# Patient Record
Sex: FEMALE | Race: WHITE | Employment: FULL TIME | URBAN - METROPOLITAN AREA
[De-identification: names, ages, dates, MRNs, and addresses within clinical notes are randomized per-mention and may not be internally consistent; named-entity substitution may affect disease eponyms.]

---

## 2017-10-12 PROBLEM — S52.514A CLOSED NONDISPLACED FRACTURE OF STYLOID PROCESS OF RIGHT RADIUS, INITIAL ENCOUNTER: Status: ACTIVE | Noted: 2017-10-12

## 2017-10-25 PROBLEM — S52.514D CLOSED NONDISPLACED FRACTURE OF STYLOID PROCESS OF RIGHT RADIUS WITH ROUTINE HEALING, SUBSEQUENT ENCOUNTER: Status: ACTIVE | Noted: 2017-10-12

## 2024-06-24 ENCOUNTER — APPOINTMENT (OUTPATIENT)
Dept: GENERAL RADIOLOGY | Age: 59
End: 2024-06-24
Attending: EMERGENCY MEDICINE

## 2024-06-24 ENCOUNTER — HOSPITAL ENCOUNTER (EMERGENCY)
Age: 59
Discharge: HOME | End: 2024-06-24
Attending: EMERGENCY MEDICINE

## 2024-06-24 VITALS
TEMPERATURE: 98 F | WEIGHT: 201 LBS | RESPIRATION RATE: 16 BRPM | OXYGEN SATURATION: 99 % | HEART RATE: 68 BPM | DIASTOLIC BLOOD PRESSURE: 77 MMHG | HEIGHT: 64 IN | BODY MASS INDEX: 34.31 KG/M2 | SYSTOLIC BLOOD PRESSURE: 96 MMHG

## 2024-06-24 DIAGNOSIS — S32.010A COMPRESSION FRACTURE OF L1 VERTEBRA, INITIAL ENCOUNTER (HCC): Primary | ICD-10-CM

## 2024-06-24 PROCEDURE — 96374 THER/PROPH/DIAG INJ IV PUSH: CPT

## 2024-06-24 PROCEDURE — 99284 EMERGENCY DEPT VISIT MOD MDM: CPT

## 2024-06-24 PROCEDURE — 72100 X-RAY EXAM L-S SPINE 2/3 VWS: CPT | Performed by: EMERGENCY MEDICINE

## 2024-06-24 RX ORDER — HYDROMORPHONE HYDROCHLORIDE 1 MG/ML
1 INJECTION, SOLUTION INTRAMUSCULAR; INTRAVENOUS; SUBCUTANEOUS ONCE
Status: COMPLETED | OUTPATIENT
Start: 2024-06-24 | End: 2024-06-24

## 2024-06-24 RX ORDER — HYDROCODONE BITARTRATE AND ACETAMINOPHEN 5; 325 MG/1; MG/1
1 TABLET ORAL EVERY 6 HOURS PRN
COMMUNITY

## 2024-06-24 RX ORDER — DIAZEPAM 5 MG/1
5 TABLET ORAL 3 TIMES DAILY PRN
Qty: 20 TABLET | Refills: 0 | Status: SHIPPED | OUTPATIENT
Start: 2024-06-24 | End: 2024-07-01

## 2024-06-24 RX ORDER — CYCLOBENZAPRINE HCL 5 MG
5 TABLET ORAL 3 TIMES DAILY PRN
COMMUNITY

## 2024-06-24 RX ORDER — ESCITALOPRAM OXALATE 10 MG/1
10 TABLET ORAL DAILY
COMMUNITY

## 2024-06-24 RX ORDER — ALPRAZOLAM 0.25 MG/1
0.25 TABLET ORAL NIGHTLY PRN
COMMUNITY

## 2024-06-24 RX ORDER — KETOROLAC TROMETHAMINE 15 MG/ML
15 INJECTION, SOLUTION INTRAMUSCULAR; INTRAVENOUS ONCE
Status: COMPLETED | OUTPATIENT
Start: 2024-06-24 | End: 2024-06-24

## 2024-06-24 RX ORDER — OMEPRAZOLE 20 MG/1
20 CAPSULE, DELAYED RELEASE ORAL
COMMUNITY

## 2024-06-24 NOTE — ED INITIAL ASSESSMENT (HPI)
Pt reports exacerbation of chronic lower back pain that has progressively gotten worse following PT for right knee surgery. \"They had me doing squats on Monday,\" pt sts.

## 2024-06-24 NOTE — ED PROVIDER NOTES
Patient Seen in: [facility] In [place]      History     Chief Complaint   Patient presents with    Back Pain     Stated Complaint: Pt reports exacerbation of chronic lower back pain that has progressively gotte*    Subjective:   HPI    58-year-old female complaining of lower back pain.  The patient states that she fell several years ago and had a compression fracture of L1 and recently had been doing well and not having any back pain but had surgery around April 15 of her right knee with a meniscus repair so she has been going to physical therapy and last week they had her doing some squats and about 5 or 6 days ago she started having some pain it worsened.  She had some hydrocodone and cyclobenzaprine at home which did not seem to improve things.  The pain is mainly in the back her knee is not bothering her there is no radicular pain no numbness tingling no bowel or bladder issues no fever chills no history of recent trauma.    Objective:   Past Medical History:    Anxiety    Back pain    Depression    Hyperlipidemia              Past Surgical History:   Procedure Laterality Date    Knee surgery Right     Repair ing hernia,5+y/o,reducibl                  Social History     Tobacco Use    Smoking status: Every Day     Types: Cigarettes     Passive exposure: Never    Smokeless tobacco: Never   Vaping Use    Vaping status: Never Used   Substance and Sexual Activity    Alcohol use: Yes     Comment: twice a week    Drug use: Never              Review of Systems    Positive for stated complaint: Pt reports exacerbation of chronic lower back pain that has progressively gotte*  Other systems are as noted in HPI.  Constitutional and vital signs reviewed.      All other systems reviewed and negative except as noted above.    Physical Exam     ED Triage Vitals [06/24/24 0705]   /61   Pulse 84   Resp 18   Temp 97.6 °F (36.4 °C)   Temp src Temporal   SpO2 99 %   O2 Device        Current Vitals:    Vital Signs  BP: 107/61  Pulse: 84  Resp: 18  Temp: 97.6 °F (36.4 °C)  Temp src: Temporal    Oxygen Therapy  SpO2: 99 %            Physical Exam    Patient is alert and orient x 3 appears uncomfortable the back there is tenderness in the midline around the mid to lower lumbar region.  Straight leg raises are negative the motor strength lower extremities normal sensations intact deep tendon reflexes are normal.      ED Course   Labs Reviewed - No data to display          XR LUMBAR SPINE (MIN 2 VIEWS) (CPT=72100)    Result Date: 6/24/2024  CONCLUSION:  Age-indeterminate compression deformity of L1 vertebral body with approximately 50% loss of height.  This could be best assessed with MRI if clinically indicated.   LOCATION:  [facility]   Dictated by (CST): Angel Perez MD on 6/24/2024 at 8:05 AM     Finalized by (CST): Angel Perez MD on 6/24/2024 at 8:06 AM      Images independently reviewed there is compression fracture of L1         MDM      Initial differential diagnosis includes but not limited to worsening of the compression fracture muscular back pain lumbar radiculopathy renal colic    Patient has a L1 compression fracture she has a history of this is unclear whether this is similar to what it has been in the past or is worsened she is neurologically intact advised her to follow-up with her spine doctor she was given pain medication emergency department prescription for home advised return if worse                               MDM    Disposition and Plan     Clinical Impression:  No diagnosis found.     Disposition:  There is no disposition on file for this visit.  There is no disposition time on file for this visit.    Follow-up:  No follow-up provider specified.        Medications Prescribed:  Current Discharge Medication List

## 2024-06-24 NOTE — DISCHARGE INSTRUCTIONS
Follow-up with spine doctor over the next week or so.  Use ice 20 is 4 times a day Tylenol or Advil for milder pain take the Norco or you can take diazepam for more severe pain diazepam as will help with pain as well as muscle spasm.  Do not take it concurrently with Norco if you are taking Norco wait 2 or 3 hours before you take the diazepam as able to make you drowsy.

## 2025-06-10 PROBLEM — F31.30 BIPOLAR DISORDER WITH CURRENT EPISODE DEPRESSED (HCC): Status: ACTIVE | Noted: 2025-06-10

## 2025-06-11 PROBLEM — F31.63 BIPOLAR DISORDER, CURRENT EPISODE MIXED, SEVERE, WITHOUT PSYCHOTIC FEATURES (HCC): Status: ACTIVE | Noted: 2025-06-11

## 2025-06-12 PROBLEM — F31.30 BIPOLAR DISORDER WITH CURRENT EPISODE DEPRESSED (HCC): Status: RESOLVED | Noted: 2025-06-10 | Resolved: 2025-06-12

## 2025-06-13 ENCOUNTER — HOSPITAL ENCOUNTER (EMERGENCY)
Facility: HOSPITAL | Age: 60
Discharge: NURSING, REHAB OR LONG-TERM CARE | End: 2025-06-13
Attending: EMERGENCY MEDICINE
Payer: COMMERCIAL

## 2025-06-13 VITALS
RESPIRATION RATE: 16 BRPM | TEMPERATURE: 98 F | DIASTOLIC BLOOD PRESSURE: 73 MMHG | OXYGEN SATURATION: 99 % | SYSTOLIC BLOOD PRESSURE: 99 MMHG | HEART RATE: 70 BPM

## 2025-06-13 DIAGNOSIS — E86.0 DEHYDRATION: Primary | ICD-10-CM

## 2025-06-13 DIAGNOSIS — I95.9 HYPOTENSION, UNSPECIFIED HYPOTENSION TYPE: ICD-10-CM

## 2025-06-13 LAB
ALBUMIN SERPL-MCNC: 4.7 G/DL (ref 3.2–4.8)
ALBUMIN/GLOB SERPL: 1.6 {RATIO} (ref 1–2)
ALP LIVER SERPL-CCNC: 78 U/L (ref 46–118)
ALT SERPL-CCNC: 14 U/L (ref 10–49)
ANION GAP SERPL CALC-SCNC: 11 MMOL/L (ref 0–18)
AST SERPL-CCNC: 14 U/L (ref ?–34)
BASOPHILS # BLD AUTO: 0.07 X10(3) UL (ref 0–0.2)
BASOPHILS NFR BLD AUTO: 0.7 %
BILIRUB SERPL-MCNC: 0.3 MG/DL (ref 0.3–1.2)
BUN BLD-MCNC: 17 MG/DL (ref 9–23)
CALCIUM BLD-MCNC: 9.5 MG/DL (ref 8.7–10.6)
CHLORIDE SERPL-SCNC: 109 MMOL/L (ref 98–112)
CO2 SERPL-SCNC: 20 MMOL/L (ref 21–32)
CREAT BLD-MCNC: 1.1 MG/DL (ref 0.55–1.02)
EGFRCR SERPLBLD CKD-EPI 2021: 58 ML/MIN/1.73M2 (ref 60–?)
EOSINOPHIL # BLD AUTO: 0.12 X10(3) UL (ref 0–0.7)
EOSINOPHIL NFR BLD AUTO: 1.3 %
ERYTHROCYTE [DISTWIDTH] IN BLOOD BY AUTOMATED COUNT: 12.7 %
GLOBULIN PLAS-MCNC: 3 G/DL (ref 2–3.5)
GLUCOSE BLD-MCNC: 128 MG/DL (ref 70–99)
HCT VFR BLD AUTO: 38.7 % (ref 35–48)
HGB BLD-MCNC: 13.1 G/DL (ref 12–16)
IMM GRANULOCYTES # BLD AUTO: 0.1 X10(3) UL (ref 0–1)
IMM GRANULOCYTES NFR BLD: 1.1 %
LYMPHOCYTES # BLD AUTO: 1.97 X10(3) UL (ref 1–4)
LYMPHOCYTES NFR BLD AUTO: 20.8 %
MCH RBC QN AUTO: 29.6 PG (ref 26–34)
MCHC RBC AUTO-ENTMCNC: 33.9 G/DL (ref 31–37)
MCV RBC AUTO: 87.6 FL (ref 80–100)
MONOCYTES # BLD AUTO: 1 X10(3) UL (ref 0.1–1)
MONOCYTES NFR BLD AUTO: 10.6 %
NEUTROPHILS # BLD AUTO: 6.21 X10 (3) UL (ref 1.5–7.7)
NEUTROPHILS # BLD AUTO: 6.21 X10(3) UL (ref 1.5–7.7)
NEUTROPHILS NFR BLD AUTO: 65.5 %
OSMOLALITY SERPL CALC.SUM OF ELEC: 293 MOSM/KG (ref 275–295)
PLATELET # BLD AUTO: 208 10(3)UL (ref 150–450)
POTASSIUM SERPL-SCNC: 3.9 MMOL/L (ref 3.5–5.1)
PROT SERPL-MCNC: 7.7 G/DL (ref 5.7–8.2)
RBC # BLD AUTO: 4.42 X10(6)UL (ref 3.8–5.3)
SODIUM SERPL-SCNC: 140 MMOL/L (ref 136–145)
WBC # BLD AUTO: 9.5 X10(3) UL (ref 4–11)

## 2025-06-13 PROCEDURE — 96361 HYDRATE IV INFUSION ADD-ON: CPT

## 2025-06-13 PROCEDURE — 99284 EMERGENCY DEPT VISIT MOD MDM: CPT

## 2025-06-13 PROCEDURE — 85025 COMPLETE CBC W/AUTO DIFF WBC: CPT | Performed by: EMERGENCY MEDICINE

## 2025-06-13 PROCEDURE — 96360 HYDRATION IV INFUSION INIT: CPT

## 2025-06-13 PROCEDURE — 80053 COMPREHEN METABOLIC PANEL: CPT | Performed by: EMERGENCY MEDICINE

## 2025-06-13 NOTE — ED PROVIDER NOTES
Patient Seen in: [facility] Emergency Department      History     Stated Complaint: Low BP    History of Present Illness  Rosalia Tello is a 59 year old female who presents with extremely low blood pressure readings.    She has experienced extremely low blood pressure readings for the last two mornings, with values dropping below her usual baseline of 104 mmHg, reaching as low as 50 mmHg this morning. She has been taking her medications consistently since arriving at [facility] on Tuesday, without missing any doses, and is concerned about the impact of her medications, particularly those taken at night, on her blood pressure.    She is slightly anemic and takes medication for it. No headache, chest pain, or abdominal pain, but she reports a cloudy head. She has been eating three meals a day and drinking water and juice regularly. Her diet has been good during her stay at [facility]. She reports having some thoughts that are not normal, although she does not elaborate further.    [Note: Patient (or parent) aware that ambient AI utilized for transcribing the HPI.]  Objective:   Past Medical History:    Anemia    Anxiety    Back pain    Depression    Esophageal reflux    Hyperlipidemia              Past Surgical History:   Procedure Laterality Date    Knee surgery Right     Repair ing hernia,5+y/o,reducibl                  Social History     Socioeconomic History    Marital status:    Tobacco Use    Smoking status: Every Day     Types: Cigarettes     Passive exposure: Never    Smokeless tobacco: Never   Vaping Use    Vaping status: Former    Substances: Nicotine    Devices: Disposable, Pt used to smoke vapes in the past   Substance and Sexual Activity    Alcohol use: Yes     Comment: weekly on saturday; pt stated they do not have a number on how much the drink    Drug use: Never     Social Drivers of Health     Food Insecurity: No Food Insecurity (6/11/2025)    NCSS - Food Insecurity     Worried  About Running Out of Food in the Last Year: No     Ran Out of Food in the Last Year: No   Transportation Needs: No Transportation Needs (6/11/2025)    NCSS - Transportation     Lack of Transportation: No   Housing Stability: Not At Risk (6/11/2025)    NCSS - Housing/Utilities     Has Housing: Yes     Worried About Losing Housing: No     Unable to Get Utilities: No              Review of Systems    Positive for stated complaint: Low BP  Other systems are as noted in HPI.  Constitutional and vital signs reviewed.      All other systems reviewed and negative except as noted above.    Physical Exam     ED Triage Vitals [06/13/25 0904]   /76   Pulse 78   Resp 16   Temp 98.2 °F (36.8 °C)   Temp src Temporal   SpO2 99 %   O2 Device None (Room air)       Current:BP 99/73   Pulse 70   Temp 98.2 °F (36.8 °C) (Temporal)   Resp 16   LMP  (LMP Unknown)   SpO2 99%     General:  Vitals as listed.  No acute distress   HEENT: Sclerae anicteric.  Conjunctivae show no pallor.  Oropharynx clear, mucous membranes moist   Lungs: good air exchange and clear   Heart: regular rate rhythm and no murmur   Abdomen: Soft and nontender.  No abdominal masses.  No peritoneal signs   Extremities: no edema, normal peripheral pulses   Neuro: Alert oriented and nonfocal  Vitals:    06/13/25 0904 06/13/25 1118   BP: 108/76 99/73   Pulse: 78 70   Resp: 16 16   Temp: 98.2 °F (36.8 °C)    TempSrc: Temporal    SpO2: 99% 99%         ED Course     Labs Reviewed   COMP METABOLIC PANEL (14) - Abnormal; Notable for the following components:       Result Value    Glucose 128 (*)     CO2 20.0 (*)     Creatinine 1.10 (*)     eGFR-Cr 58 (*)     All other components within normal limits   CBC WITH DIFFERENTIAL WITH PLATELET   RAINBOW DRAW LAVENDER   RAINBOW DRAW LIGHT GREEN   RAINBOW DRAW BLUE       ED COURSE and MDM     Differential diagnosis before testing included hypotension due to dehydration versus hemorrhage, a medical condition that poses a threat  to life.    I reviewed prior external notes including evaluation on 6/13/2025 at [facility] prior to transfer    Medically stable/cleared to return to [facility].  Patient should hydrate better.    I have discussed with the patient the results of testing, differential diagnosis, and treatment plan. They expressed clear understanding of these instructions and agrees to the plan provided.    Disposition and Plan     Clinical Impression:  1. Dehydration    2. Hypotension, unspecified hypotension type         Disposition:  Psychiatric transfer  6/13/2025 11:26 am    Follow-up:  Paige Washburn MD  [address]  [address] [address]  [phone]    Schedule an appointment as soon as possible for a visit in 1 week(s)  As needed        Medications Prescribed:  Discharge Medication List as of 6/13/2025 12:03 PM

## 2025-06-13 NOTE — ED INITIAL ASSESSMENT (HPI)
Patient arrives to the ED via [facility] from [facility] where she is inpatient with c/o hypotension of SBP in the 60s. Patient states she was no sleeping well last noc, and was given meds by the RN to help her sleep a little better, and when she woke up the RN checked her BP and was told her BP was in the 60s. Patient states she has low BP, ranging from  that is normal. States it has never dropped as low as it was today at [facility]. Patient states she also takes Norco along with the meds given for sleep.     Respiratory rate even and non-labored.  Skin is pink, warm, and dry.  Mucous membranes are pink and moist.  No complaints of chest pain or difficulty breathing.

## 2025-06-19 PROBLEM — F15.20 MODERATE STIMULANT USE DISORDER (HCC): Status: ACTIVE | Noted: 2025-06-19

## 2025-06-19 PROBLEM — F10.10 ALCOHOL USE DISORDER, MILD, ABUSE: Status: ACTIVE | Noted: 2025-06-19

## 2025-06-19 PROBLEM — F50.9 EATING DISORDER: Status: ACTIVE | Noted: 2025-06-19

## 2025-06-19 PROBLEM — F43.9 TRAUMA AND STRESSOR-RELATED DISORDER: Status: ACTIVE | Noted: 2025-06-19

## 2025-06-19 PROBLEM — F41.9 ANXIETY DISORDER: Status: ACTIVE | Noted: 2025-06-19

## 2025-06-20 ENCOUNTER — LAB ENCOUNTER (OUTPATIENT)
Dept: LAB | Age: 60
End: 2025-06-20
Attending: Other
Payer: COMMERCIAL

## 2025-06-20 DIAGNOSIS — R41.3 MEMORY DIFFICULTY: ICD-10-CM

## 2025-06-20 LAB
FOLATE SERPL-MCNC: 7.5 NG/ML (ref 5.4–?)
VIT B12 SERPL-MCNC: 341 PG/ML (ref 211–911)

## 2025-06-20 PROCEDURE — 82607 VITAMIN B-12: CPT

## 2025-06-20 PROCEDURE — 36415 COLL VENOUS BLD VENIPUNCTURE: CPT

## 2025-06-20 PROCEDURE — 82746 ASSAY OF FOLIC ACID SERUM: CPT

## 2025-07-21 ENCOUNTER — HOSPITAL ENCOUNTER (OUTPATIENT)
Dept: CV DIAGNOSTICS | Age: 60
Discharge: HOME | End: 2025-07-21
Payer: COMMERCIAL

## 2025-07-21 DIAGNOSIS — R60.0 LOCALIZED EDEMA: ICD-10-CM

## 2025-07-21 PROCEDURE — 93306 TTE W/DOPPLER COMPLETE: CPT

## 2025-07-22 ENCOUNTER — APPOINTMENT (OUTPATIENT)
Dept: ULTRASOUND IMAGING | Facility: HOSPITAL | Age: 60
End: 2025-07-22
Attending: EMERGENCY MEDICINE
Payer: COMMERCIAL

## 2025-07-22 ENCOUNTER — APPOINTMENT (OUTPATIENT)
Dept: GENERAL RADIOLOGY | Facility: HOSPITAL | Age: 60
End: 2025-07-22
Attending: EMERGENCY MEDICINE
Payer: COMMERCIAL

## 2025-07-22 ENCOUNTER — HOSPITAL ENCOUNTER (EMERGENCY)
Facility: HOSPITAL | Age: 60
Discharge: HOME | End: 2025-07-22
Attending: EMERGENCY MEDICINE
Payer: COMMERCIAL

## 2025-07-22 VITALS
BODY MASS INDEX: 31.99 KG/M2 | TEMPERATURE: 98 F | OXYGEN SATURATION: 100 % | HEART RATE: 65 BPM | SYSTOLIC BLOOD PRESSURE: 121 MMHG | HEIGHT: 65 IN | RESPIRATION RATE: 15 BRPM | WEIGHT: 192 LBS | DIASTOLIC BLOOD PRESSURE: 59 MMHG

## 2025-07-22 DIAGNOSIS — M79.89 LEG SWELLING: Primary | ICD-10-CM

## 2025-07-22 LAB
ALBUMIN SERPL-MCNC: 4.6 G/DL (ref 3.2–4.8)
ALBUMIN/GLOB SERPL: 1.7 (ref 1–2)
ALP LIVER SERPL-CCNC: 70 U/L (ref 46–118)
ALT SERPL-CCNC: 13 U/L (ref 10–49)
ANION GAP SERPL CALC-SCNC: 7 MMOL/L (ref 0–18)
APTT PPP: 25.3 SECONDS (ref 23–36)
AST SERPL-CCNC: 14 U/L (ref ?–34)
ATRIAL RATE: 76 BPM
BASOPHILS # BLD AUTO: 0.03 X10(3) UL (ref 0–0.2)
BASOPHILS NFR BLD AUTO: 0.3 %
BILIRUB SERPL-MCNC: 0.2 MG/DL (ref 0.3–1.2)
BUN BLD-MCNC: 14 MG/DL (ref 9–23)
CALCIUM BLD-MCNC: 9.4 MG/DL (ref 8.7–10.6)
CHLORIDE SERPL-SCNC: 108 MMOL/L (ref 98–112)
CO2 SERPL-SCNC: 25 MMOL/L (ref 21–32)
CREAT BLD-MCNC: 1.14 MG/DL (ref 0.55–1.02)
EGFRCR SERPLBLD CKD-EPI 2021: 56 ML/MIN/1.73M2 (ref 60–?)
EOSINOPHIL # BLD AUTO: 0.04 X10(3) UL (ref 0–0.7)
EOSINOPHIL NFR BLD AUTO: 0.4 %
ERYTHROCYTE [DISTWIDTH] IN BLOOD BY AUTOMATED COUNT: 12.1 %
GLOBULIN PLAS-MCNC: 2.7 G/DL (ref 2–3.5)
GLUCOSE BLD-MCNC: 117 MG/DL (ref 70–99)
HCT VFR BLD AUTO: 35.9 % (ref 35–48)
HGB BLD-MCNC: 12.5 G/DL (ref 12–16)
IMM GRANULOCYTES # BLD AUTO: 0.06 X10(3) UL (ref 0–1)
IMM GRANULOCYTES NFR BLD: 0.6 %
INR BLD: 1.08 (ref 0.8–1.2)
LYMPHOCYTES # BLD AUTO: 2.03 X10(3) UL (ref 1–4)
LYMPHOCYTES NFR BLD AUTO: 20.7 %
MCH RBC QN AUTO: 29.7 PG (ref 26–34)
MCHC RBC AUTO-ENTMCNC: 34.8 G/DL (ref 31–37)
MCV RBC AUTO: 85.3 FL (ref 80–100)
MONOCYTES # BLD AUTO: 0.87 X10(3) UL (ref 0.1–1)
MONOCYTES NFR BLD AUTO: 8.9 %
NEUTROPHILS # BLD AUTO: 6.8 X10 (3) UL (ref 1.5–7.7)
NEUTROPHILS # BLD AUTO: 6.8 X10(3) UL (ref 1.5–7.7)
NEUTROPHILS NFR BLD AUTO: 69.1 %
NT-PROBNP SERPL-MCNC: 181 PG/ML (ref ?–125)
OSMOLALITY SERPL CALC.SUM OF ELEC: 292 MOSM/KG (ref 275–295)
P AXIS: 30 DEGREES
P-R INTERVAL: 154 MS
PLATELET # BLD AUTO: 233 10(3)UL (ref 150–450)
POTASSIUM SERPL-SCNC: 4.3 MMOL/L (ref 3.5–5.1)
PROT SERPL-MCNC: 7.3 G/DL (ref 5.7–8.2)
PROTHROMBIN TIME: 14.1 SECONDS (ref 11.6–14.8)
Q-T INTERVAL: 384 MS
QRS DURATION: 86 MS
QTC CALCULATION (BEZET): 432 MS
R AXIS: -17 DEGREES
RBC # BLD AUTO: 4.21 X10(6)UL (ref 3.8–5.3)
SODIUM SERPL-SCNC: 140 MMOL/L (ref 136–145)
T AXIS: 2 DEGREES
TROPONIN I SERPL HS-MCNC: <3 NG/L (ref ?–34)
VENTRICULAR RATE: 76 BPM
WBC # BLD AUTO: 9.8 X10(3) UL (ref 4–11)

## 2025-07-22 PROCEDURE — 85730 THROMBOPLASTIN TIME PARTIAL: CPT

## 2025-07-22 PROCEDURE — 83880 ASSAY OF NATRIURETIC PEPTIDE: CPT

## 2025-07-22 PROCEDURE — 36415 COLL VENOUS BLD VENIPUNCTURE: CPT

## 2025-07-22 PROCEDURE — 85610 PROTHROMBIN TIME: CPT | Performed by: EMERGENCY MEDICINE

## 2025-07-22 PROCEDURE — 85025 COMPLETE CBC W/AUTO DIFF WBC: CPT | Performed by: EMERGENCY MEDICINE

## 2025-07-22 PROCEDURE — 99284 EMERGENCY DEPT VISIT MOD MDM: CPT

## 2025-07-22 PROCEDURE — 83880 ASSAY OF NATRIURETIC PEPTIDE: CPT | Performed by: EMERGENCY MEDICINE

## 2025-07-22 PROCEDURE — 85025 COMPLETE CBC W/AUTO DIFF WBC: CPT

## 2025-07-22 PROCEDURE — 80053 COMPREHEN METABOLIC PANEL: CPT

## 2025-07-22 PROCEDURE — 80053 COMPREHEN METABOLIC PANEL: CPT | Performed by: EMERGENCY MEDICINE

## 2025-07-22 PROCEDURE — 85610 PROTHROMBIN TIME: CPT

## 2025-07-22 PROCEDURE — 84484 ASSAY OF TROPONIN QUANT: CPT

## 2025-07-22 PROCEDURE — 85730 THROMBOPLASTIN TIME PARTIAL: CPT | Performed by: EMERGENCY MEDICINE

## 2025-07-22 PROCEDURE — 84484 ASSAY OF TROPONIN QUANT: CPT | Performed by: EMERGENCY MEDICINE

## 2025-07-22 PROCEDURE — 93005 ELECTROCARDIOGRAM TRACING: CPT

## 2025-07-22 PROCEDURE — 93010 ELECTROCARDIOGRAM REPORT: CPT

## 2025-07-22 PROCEDURE — 93970 EXTREMITY STUDY: CPT | Performed by: EMERGENCY MEDICINE

## 2025-07-22 RX ORDER — FUROSEMIDE 40 MG/1
20 TABLET ORAL ONCE
Status: COMPLETED | OUTPATIENT
Start: 2025-07-22 | End: 2025-07-22

## 2025-07-22 RX ORDER — FUROSEMIDE 20 MG/1
20 TABLET ORAL DAILY
Qty: 7 TABLET | Refills: 0 | Status: SHIPPED | OUTPATIENT
Start: 2025-07-22 | End: 2025-07-29

## 2025-07-22 NOTE — ED PROVIDER NOTES
Patient Seen in: [facility] Emergency Department        History  Chief Complaint   Patient presents with    Swelling Edema     Stated Complaint: PCP sent here for swollen legs and feet. ECHO outpt yesterday and today more sw*    Subjective:   HPI            59-year-old female presents for evaluation of lower extremity swelling over the past 2 to 3 weeks.  Symptoms are worse when she is standing for long periods of time at work.  Denies any significant chest pain or shortness of breath.  No orthopnea.  PCP ordered an echocardiogram which was done yesterday and is reportedly normal.      Objective:     Past Medical History:    Anemia    Anxiety    Back pain    Depression    Esophageal reflux    Hyperlipidemia              Past Surgical History:   Procedure Laterality Date          Cholecystectomy      Knee surgery Right     Repair ing hernia,5+y/o,reducibl                  Social History     Socioeconomic History    Marital status:    Tobacco Use    Smoking status: Every Day     Types: Cigarettes     Passive exposure: Never    Smokeless tobacco: Never   Vaping Use    Vaping status: Former    Substances: Nicotine    Devices: Disposable, Pt used to smoke vapes in the past   Substance and Sexual Activity    Alcohol use: Yes     Comment: weekly on saturday; pt stated they do not have a number on how much the drink    Drug use: Never     Social Drivers of Health     Food Insecurity: No Food Insecurity (2025)    NCSS - Food Insecurity     Worried About Running Out of Food in the Last Year: No     Ran Out of Food in the Last Year: No   Transportation Needs: No Transportation Needs (2025)    NCSS - Transportation     Lack of Transportation: No   Housing Stability: Not At Risk (2025)    NCSS - Housing/Utilities     Has Housing: Yes     Worried About Losing Housing: No     Unable to Get Utilities: No                                Physical Exam    ED Triage Vitals [25 1528]   BP  110/80   Pulse 81   Resp 18   Temp 98.3 °F (36.8 °C)   Temp src Temporal   SpO2 98 %   O2 Device None (Room air)       Current Vitals:   Vital Signs  BP: 121/59  Pulse: 65  Resp: 15  Temp: 98.3 °F (36.8 °C)  Temp src: Temporal  MAP (mmHg): 73    Oxygen Therapy  SpO2: 100 %  O2 Device: None (Room air)            Physical Exam       General: Alert, oriented, no apparent distress  HEENT:  Pupils equal reactive.  Extraocular motions intact. MMM.  Neck: Supple  Lungs: Clear to auscultation bilaterally.  Heart: Regular rate and rhythm.  Abdomen: Soft, nontender.   Skin: No rash.  2+ pitting edema bilateral calf  Neurologic: No focal neurologic deficits.  Normal speech pattern  Musculoskeletal: No tenderness or deformity noted.  Full range of motion throughout.        ED Course  Labs Reviewed   COMP METABOLIC PANEL (14) - Abnormal; Notable for the following components:       Result Value    Glucose 117 (*)     Creatinine 1.14 (*)     eGFR-Cr 56 (*)     Bilirubin, Total 0.2 (*)     All other components within normal limits   PRO BETA NATRIURETIC PEPTIDE - Abnormal; Notable for the following components:    Pro-Beta Natriuretic Peptide 181 (*)     All other components within normal limits   TROPONIN I HIGH SENSITIVITY - Normal   PROTHROMBIN TIME (PT) - Normal   PTT, ACTIVATED - Normal   CBC WITH DIFFERENTIAL WITH PLATELET   RAINBOW DRAW LAVENDER   RAINBOW DRAW LIGHT GREEN   RAINBOW DRAW BLUE     EKG    Rate, intervals and axes as noted on EKG Report.  Rate: 76  Rhythm: Sinus Rhythm  Reading: no ST elevation or depression              US VENOUS DOPPLER LEG BILAT - DIAG IMG (CPT=93970)  Result Date: 7/22/2025  PROCEDURE: US VENOUS DOPPLER LEG BILAT - DIAG IMG (CPT=93970) INDICATIONS: eval for DVT PATIENT STATED HISTORY: Bilateral lower extremity swelling and pain COMPARISON: None  TECHNIQUE:  Real time gray-scale and duplex ultrasound was used to evaluate the bilateral lower extremity venous system. B-mode two-dimensional  images of the vascular structures, Doppler spectral analysis, and color flow Doppler imaging were performed. Patient was scanned in the upright position, or if unable to be upright, was scanned in a reverse Trendelenburg position FINDINGS: EXTREMITY: Bilateral lower Extremity THROMBI: No thrombus visible. COMPRESSION: Normal compressibility, phasicity, and augmentation OTHER: Soft tissue edema within the lower legs.      CONCLUSION: No acute deep vein thrombosis Electronically Verified and Signed by Attending Radiologist: Diego Stern MD 2025 5:55 PM Workstation: [id number]    [facility] ECHO 2D DOPPLER (CPT=93306)  Result Date: 2025  Transthoracic Echocardiogram Name:Rosalia Foley Date: 2025 :  12/10/1965 Ht:  (64in)  BP: 100 / 50 MRN:  [id number]    Age:  59years    Wt:  (192lb) HR: 67bpm Loc:  [facility]       Gndr: F          BSA: 1.92m^2 Sonographer: Daniel ZARCO Ordering:    Nonstaff, Physician Referring:   Nonstaff, Physician ---------------------------------------------------------------------------- History/Indications:   Edema. ---------------------------------------------------------------------------- Procedure information:  A transthoracic complete 2D study was performed. Additional evaluation included M-mode, complete spectral Doppler, and color Doppler.  Patient status:  Outpatient.  Location:  Echo laboratory.    This was a routine study. Transthoracic echocardiography for ventricular function evaluation and assessment of valvular function. Image quality was adequate. ECG rhythm:   Normal sinus  Study completion:  There were no complications. ---------------------------------------------------------------------------- Conclusions: 1. Left ventricle: The cavity size was normal. Wall thickness was normal.    Systolic function was normal. The estimated ejection fraction was 60-65%,    by visual assessment. No diagnostic evidence for regional wall motion    abnormalities. Left ventricular  diastolic function parameters were    normal. 2. Left atrium: The atrium was mildly dilated. 3. Mitral valve: There was mild regurgitation. 4. Pulmonary arteries: Systolic pressure was within the normal range, in the    range of 20mm Hg to 25mm Hg. Impressions:  No previous study was available for comparison. * ---------------------------------------------------------------------------- * Findings: Left ventricle:  The cavity size was normal. Wall thickness was normal. Systolic function was normal. The estimated ejection fraction was 60-65%, by visual assessment. No diagnostic evidence for regional wall motion abnormalities. Left ventricular diastolic function parameters were normal. Ventricular septum:   Thickness was normal. Left atrium:  The atrium was mildly dilated. Right ventricle:  The cavity size was normal. Systolic function was normal. Right atrium:  The atrium was normal in size. Mitral valve:  The annulus was mildly calcified. Leaflet separation was normal.  Doppler:  Transvalvular velocity was within the normal range. There was no evidence for stenosis. There was mild regurgitation. Aortic valve:  The valve was structurally normal. The valve was trileaflet. Cusp separation was normal.  Doppler:  Transvalvular velocity was within the normal range. There was no evidence for stenosis. There was no significant regurgitation. Tricuspid valve:  The valve is structurally normal. Leaflet separation was normal.  Doppler:  Transvalvular velocity was within the normal range. There was no evidence for stenosis. There was trivial regurgitation. Pulmonic valve:   The valve is structurally normal. Cusp separation was normal.  Doppler:  Transvalvular velocity was within the normal range. There was no evidence for stenosis. There was no significant regurgitation. Pericardium:   There was no pericardial effusion. Aorta: Aortic root: The aortic root was normal. Ascending aorta: The ascending aorta was normal. Pulmonary  arteries: Systolic pressure was within the normal range, in the range of 20mm Hg to 25mm Hg. Systemic veins:  Central venous respirophasic diameter changes are in the normal range (>50%). Inferior vena cava: The IVC was normal-sized. ---------------------------------------------------------------------------- Measurements  Left ventricle                    Value        Ref  IVS thickness, ED, PLAX       (H) 1.0   cm     0.6 -                                                 0.9  LV ID, ED, PLAX                   5.2   cm     3.8 -                                                 5.2  LV ID, ES, PLAX                   3.3   cm     2.2 -                                                 3.5  LV PW thickness, ED, PLAX     (H) 1.1   cm     0.6 -                                                 0.9  IVS/LV PW ratio, ED, PLAX         0.96         --------  LV PW/LV ID ratio, ED, PLAX       0.21         --------  LV ejection fraction              65    %      54 - 74  LV e', lateral                    11.0  cm/sec >=10.0  LV E/e', lateral                  9            <=13  LV e', medial                     7.8   cm/sec >=7.0  LV E/e', medial                   12           --------  LV e', average                    9.4   cm/sec --------  LV E/e', average                  10           <=14  Aortic root                       Value        Ref  Aortic root ID, ED                3.1   cm     2.6 -                                                 4.1  Ascending aorta                   Value        Ref  Ascending aorta ID, A-P, ED       2.7   cm     1.9 -                                                 3.5  Left atrium                       Value        Ref  LA ID, A-P, ES                (H) 4.1   cm     2.7 -                                                 3.8  LA volume, S                  (H) 71    ml     22 - 52  LA volume/bsa, S              (H) 37    ml/m^2 16 - 34  LA volume, ES, 1-p A4C        (H) 72    ml     22 - 52  LA  volume, ES, 1-p A2C        (H) 75    ml     22 - 52  LA volume, ES, A/L                78    ml     --------  LA volume/bsa, ES, A/L        (H) 41    ml/m^2 16 - 34  LA/aortic root ratio              1.32         --------  Mitral valve                      Value        Ref  Mitral E-wave peak velocity       0.95  m/sec  --------  Mitral A-wave peak velocity       0.92  m/sec  --------  Mitral peak gradient, D           4     mm Hg  --------  Mitral E/A ratio, peak            1            --------  Pulmonary artery                  Value        Ref  PA pressure, S, DP                25    mm Hg  --------  Tricuspid valve                   Value        Ref  Tricuspid regurg peak             2.21  m/sec  <=2.8  velocity  Tricuspid peak RV-RA gradient     20    mm Hg  --------  Systemic veins                    Value        Ref  Estimated CVP                     5     mm Hg  --------  Right ventricle                   Value        Ref  TAPSE, 2D                         2.00  cm     >=1.70  RV pressure, S, DP                25    mm Hg  -------- Legend: (L)  and  (H)  kiersten values outside specified reference range. ---------------------------------------------------------------------------- Prepared and electronically signed by Kavitha Blackburn 07/21/2025 09:56                       MDM     Differential diagnosis includes CHF, renal insufficiency, DVT    CBC is normal, chemistry unremarkable.  Troponin negative    Medical Decision Making  Amount and/or Complexity of Data Reviewed  External Data Reviewed: ECG.     Details: Reviewed ECHO from yesterday, normal EF, no significant abnl    US VENOUS DOPPLER LEG BILAT - DIAG IMG (CPT=93970)  Result Date: 7/22/2025  PROCEDURE: US VENOUS DOPPLER LEG BILAT - DIAG IMG (CPT=93970) INDICATIONS: eval for DVT PATIENT STATED HISTORY: Bilateral lower extremity swelling and pain COMPARISON: None  TECHNIQUE:  Real time gray-scale and duplex ultrasound was used to evaluate the bilateral lower  extremity venous system. B-mode two-dimensional images of the vascular structures, Doppler spectral analysis, and color flow Doppler imaging were performed. Patient was scanned in the upright position, or if unable to be upright, was scanned in a reverse Trendelenburg position FINDINGS: EXTREMITY: Bilateral lower Extremity THROMBI: No thrombus visible. COMPRESSION: Normal compressibility, phasicity, and augmentation OTHER: Soft tissue edema within the lower legs.      CONCLUSION: No acute deep vein thrombosis Electronically Verified and Signed by Attending Radiologist: Diego Stern MD 7/22/2025 5:55 PM Workstation: [id number]    Patient will be started on 1 week of Lasix.  She is aware if she needs further refills she should get them through her PCP as her electrolytes may need to be monitored.    Disposition and Plan     Clinical Impression:  1. Leg swelling         Disposition:  Discharge  7/22/2025  6:10 pm    Follow-up:  Paige Washburn MD  [address]  [place] [address]  [phone]    Schedule an appointment as soon as possible for a visit in 1 week(s)            Medications Prescribed:  Current Discharge Medication List        START taking these medications    Details   furosemide 20 MG Oral Tab Take 1 tablet (20 mg total) by mouth daily for 7 days.  Qty: 7 tablet, Refills: 0                   Supplementary Documentation:

## 2025-07-22 NOTE — DISCHARGE INSTRUCTIONS
I called in 1 week of diuretic therapy.  If you need further refills, please contact your PCP.  Longer-term therapy with diuretics may cause some electrolyte issues that need to be monitored closely

## 2025-07-22 NOTE — ED INITIAL ASSESSMENT (HPI)
BLE swelling for the past few weeks. + SOB. Reports difficulty walking. Reports feeling lightheaded and dizzy. Had an echo yesterday.